# Patient Record
Sex: FEMALE | Race: WHITE | Employment: FULL TIME | URBAN - METROPOLITAN AREA
[De-identification: names, ages, dates, MRNs, and addresses within clinical notes are randomized per-mention and may not be internally consistent; named-entity substitution may affect disease eponyms.]

---

## 2018-03-05 ENCOUNTER — OFFICE VISIT (OUTPATIENT)
Dept: FAMILY MEDICINE CLINIC | Facility: CLINIC | Age: 23
End: 2018-03-05

## 2018-03-05 VITALS
HEART RATE: 60 BPM | RESPIRATION RATE: 18 BRPM | HEIGHT: 66 IN | BODY MASS INDEX: 22.02 KG/M2 | WEIGHT: 137 LBS | SYSTOLIC BLOOD PRESSURE: 99 MMHG | DIASTOLIC BLOOD PRESSURE: 65 MMHG

## 2018-03-05 DIAGNOSIS — F41.9 ANXIETY: ICD-10-CM

## 2018-03-05 PROCEDURE — 99212 OFFICE O/P EST SF 10 MIN: CPT | Performed by: FAMILY MEDICINE

## 2018-03-05 PROCEDURE — 99213 OFFICE O/P EST LOW 20 MIN: CPT | Performed by: FAMILY MEDICINE

## 2018-03-05 NOTE — PROGRESS NOTES
HPI:    Patient ID: Criss Hampton is a 23year old female. Pt presents with some left ear discomfort and associated. Pt also has had some left sided chest tightness. Pt has had some tingling of the face. Pt did have some mild cold symptoms.  Pt did have s were placed in this encounter.       Meds This Visit:  No prescriptions requested or ordered in this encounter    Imaging & Referrals:  None       #[id number]

## 2018-03-26 ENCOUNTER — OFFICE VISIT (OUTPATIENT)
Dept: OTOLARYNGOLOGY | Facility: CLINIC | Age: 23
End: 2018-03-26

## 2018-03-26 VITALS
SYSTOLIC BLOOD PRESSURE: 98 MMHG | DIASTOLIC BLOOD PRESSURE: 62 MMHG | WEIGHT: 135 LBS | HEIGHT: 66 IN | BODY MASS INDEX: 21.69 KG/M2 | TEMPERATURE: 98 F

## 2018-03-26 DIAGNOSIS — M26.69 TMJ INFLAMMATION: Primary | ICD-10-CM

## 2018-03-26 PROCEDURE — 99244 OFF/OP CNSLTJ NEW/EST MOD 40: CPT | Performed by: OTOLARYNGOLOGY

## 2018-03-26 PROCEDURE — 99212 OFFICE O/P EST SF 10 MIN: CPT | Performed by: OTOLARYNGOLOGY

## 2018-03-26 NOTE — PROGRESS NOTES
Eloisa Flanagan is a 23year old female.  Patient presents with:  Ear Problem: pt reports electric shock of pain in both ears (mostly in left)  and goes to they eye, happens everday for 1 month  Tonsil Problem: pt reports ball on left side of tonsil for 2 jens Behavioral issues   Integumentary Negative Frequent skin infections, pigment change and rash. Hema/Lymph Negative Easy bleeding and easy bruising.      PHYSICAL EXAM    BP 98/62 (BP Location: Left arm, Patient Position: Sitting, Cuff Size: adult)   Temp 9

## 2018-05-19 ENCOUNTER — OFFICE VISIT (OUTPATIENT)
Dept: FAMILY MEDICINE CLINIC | Facility: CLINIC | Age: 23
End: 2018-05-19

## 2018-05-19 VITALS
SYSTOLIC BLOOD PRESSURE: 101 MMHG | BODY MASS INDEX: 21.35 KG/M2 | WEIGHT: 136 LBS | DIASTOLIC BLOOD PRESSURE: 55 MMHG | TEMPERATURE: 99 F | HEART RATE: 55 BPM | HEIGHT: 67 IN

## 2018-05-19 DIAGNOSIS — Z11.1 SCREENING FOR TUBERCULOSIS: ICD-10-CM

## 2018-05-19 DIAGNOSIS — Z00.00 ROUTINE GENERAL MEDICAL EXAMINATION AT A HEALTH CARE FACILITY: Primary | ICD-10-CM

## 2018-05-19 PROCEDURE — 99395 PREV VISIT EST AGE 18-39: CPT | Performed by: PHYSICIAN ASSISTANT

## 2018-05-19 PROCEDURE — 86580 TB INTRADERMAL TEST: CPT | Performed by: PHYSICIAN ASSISTANT

## 2018-05-19 NOTE — PROGRESS NOTES
HPI:   Eda Gentile is a 23year old female who presents for physical exam for grad school program.  She does not have her immunization record with her and does not know date of last Tdap. She needs tuberculosis screening.   No history of positive PPD in p seizures, dizziness, syncope, paralysis, ataxia, numbness or tingling in the extremities,change in bowel or bladder control. HEMATOLOGIC:  Denies anemia, bleeding or bruising. LYMPHATICS:  Denies enlarged nodes or history of splenectomy.   ENDOCRINOLOGIC: INTRADERMAL TEST        Return in about 2 days (around 5/21/2018) for Tb reading.     Patricia Recinos PA-C  5/19/2018

## 2018-06-11 ENCOUNTER — TELEPHONE (OUTPATIENT)
Dept: FAMILY MEDICINE CLINIC | Facility: CLINIC | Age: 23
End: 2018-06-11

## 2018-06-11 NOTE — TELEPHONE ENCOUNTER
Pt is requesting TB test. Pt stts she went in and had TB but missed her TB reading due to schedule conflict,pt stts she was informed by office of a late time she can come in but when she came to the office no one was there. . Pt has been in contact with sienna

## 2018-06-16 ENCOUNTER — NURSE ONLY (OUTPATIENT)
Dept: FAMILY MEDICINE CLINIC | Facility: CLINIC | Age: 23
End: 2018-06-16

## 2018-06-16 PROCEDURE — 86580 TB INTRADERMAL TEST: CPT | Performed by: PHYSICIAN ASSISTANT

## 2018-06-18 ENCOUNTER — NURSE ONLY (OUTPATIENT)
Dept: FAMILY MEDICINE CLINIC | Facility: CLINIC | Age: 23
End: 2018-06-18

## 2018-06-18 DIAGNOSIS — Z11.1 ENCOUNTER FOR PPD SKIN TEST READING: Primary | ICD-10-CM

## 2018-07-14 NOTE — TELEPHONE ENCOUNTER
Pt booked the appt for 6-16-18 @ 8:30. Pt to imaging department from waiting room and imaging staff advised that pt can be brought back to room 7 when done.

## 2018-11-18 ENCOUNTER — HOSPITAL ENCOUNTER (EMERGENCY)
Facility: HOSPITAL | Age: 23
Discharge: HOME | End: 2018-11-18
Attending: EMERGENCY MEDICINE
Payer: COMMERCIAL

## 2018-11-18 ENCOUNTER — APPOINTMENT (OUTPATIENT)
Dept: CT IMAGING | Facility: HOSPITAL | Age: 23
End: 2018-11-18
Attending: EMERGENCY MEDICINE
Payer: COMMERCIAL

## 2018-11-18 VITALS
BODY MASS INDEX: 21.69 KG/M2 | DIASTOLIC BLOOD PRESSURE: 50 MMHG | WEIGHT: 135 LBS | SYSTOLIC BLOOD PRESSURE: 95 MMHG | HEART RATE: 55 BPM | HEIGHT: 66 IN | OXYGEN SATURATION: 99 % | TEMPERATURE: 98 F | RESPIRATION RATE: 18 BRPM

## 2018-11-18 DIAGNOSIS — R10.9 LEFT FLANK PAIN: Primary | ICD-10-CM

## 2018-11-18 PROCEDURE — 80048 BASIC METABOLIC PNL TOTAL CA: CPT | Performed by: EMERGENCY MEDICINE

## 2018-11-18 PROCEDURE — 99284 EMERGENCY DEPT VISIT MOD MDM: CPT

## 2018-11-18 PROCEDURE — 36415 COLL VENOUS BLD VENIPUNCTURE: CPT

## 2018-11-18 PROCEDURE — 81003 URINALYSIS AUTO W/O SCOPE: CPT | Performed by: EMERGENCY MEDICINE

## 2018-11-18 PROCEDURE — 74176 CT ABD & PELVIS W/O CONTRAST: CPT | Performed by: EMERGENCY MEDICINE

## 2018-11-18 PROCEDURE — 81025 URINE PREGNANCY TEST: CPT

## 2018-11-18 PROCEDURE — 85025 COMPLETE CBC W/AUTO DIFF WBC: CPT | Performed by: EMERGENCY MEDICINE

## 2018-11-18 PROCEDURE — 85025 COMPLETE CBC W/AUTO DIFF WBC: CPT

## 2018-11-18 PROCEDURE — 80048 BASIC METABOLIC PNL TOTAL CA: CPT

## 2018-11-18 RX ORDER — PANTOPRAZOLE SODIUM 40 MG/1
40 TABLET, DELAYED RELEASE ORAL DAILY
Qty: 30 TABLET | Refills: 0 | Status: SHIPPED | OUTPATIENT
Start: 2018-11-18 | End: 2018-12-18

## 2018-11-18 NOTE — ED PROVIDER NOTES
Patient Seen in: [facility] AND [facility] Emergency Department    History   Patient presents with:  Abdomen/Flank Pain (GI/)    Stated Complaint: abdominal flank pain    HPI    Pt is 19 yo F who p/w 5-6 days of constant left flank pain.  Progressively getting motor strength in all extremities, no focal deficits  SKIN: warm, dry, no rashes        ED Course     Labs Reviewed   BASIC METABOLIC PANEL (8) - Normal   EMH POCT PREGNANCY URINE - Normal   CBC WITH DIFFERENTIAL WITH PLATELET    Narrative:      The followi try a trial of Protonix and bland diet and advised follow-up with her PCP this week.             Disposition and Plan     Clinical Impression:  Left flank pain  (primary encounter diagnosis)    Disposition:  Discharge  11/18/2018  7:30 am    Follow-up:  Judy

## 2018-11-19 ENCOUNTER — TELEPHONE (OUTPATIENT)
Dept: FAMILY MEDICINE CLINIC | Facility: CLINIC | Age: 23
End: 2018-11-19

## 2018-11-19 NOTE — TELEPHONE ENCOUNTER
Dr Annika Chavez, please advise. Patient seen in ER yesterday for abdominal pain, after UC visit on Friday. Was told it may be an ulcer, advised to take pantoprazole 40 mg, which she is doing daily.  Scheduled for ER f/u Wed 11/21/18 at 9:30 am as she has multiple

## 2018-11-19 NOTE — TELEPHONE ENCOUNTER
Spoke with pt informed of  message below. Pt to call us back if symptoms do not resolve. Pt verbalized understanding. Pt had no questions or concerns at present moment.

## 2018-11-21 ENCOUNTER — OFFICE VISIT (OUTPATIENT)
Dept: FAMILY MEDICINE CLINIC | Facility: CLINIC | Age: 23
End: 2018-11-21
Payer: COMMERCIAL

## 2018-11-21 VITALS
WEIGHT: 138 LBS | SYSTOLIC BLOOD PRESSURE: 105 MMHG | HEIGHT: 67 IN | TEMPERATURE: 98 F | BODY MASS INDEX: 21.66 KG/M2 | RESPIRATION RATE: 18 BRPM | DIASTOLIC BLOOD PRESSURE: 63 MMHG | HEART RATE: 66 BPM

## 2018-11-21 DIAGNOSIS — K29.00 ACUTE GASTRITIS WITHOUT HEMORRHAGE, UNSPECIFIED GASTRITIS TYPE: Primary | ICD-10-CM

## 2018-11-21 DIAGNOSIS — R10.13 EPIGASTRIC PAIN: ICD-10-CM

## 2018-11-21 PROCEDURE — 99213 OFFICE O/P EST LOW 20 MIN: CPT | Performed by: FAMILY MEDICINE

## 2018-11-21 NOTE — PROGRESS NOTES
HPI:    Patient ID: Daniela Cartwright is a 20year old female. Pt presents for follow up from the urgent care and ER for abdominal pains of the left side over the past week. Was diagnosed with possible GERD/ gastritis.  Patient is being treated with pantopraz reviewed and negative except as noted above.       Physical Exam       ED Triage Vitals  BP 11/18/18 0452 114/80  Pulse 11/18/18 0452 69  Resp 11/18/18 0452 18  Temp 11/18/18 0453 98 °F (36.7 °C)  Temp src --    SpO2 11/18/18 0452 100 %  O2 Device 11/18/18 nephroureterolithiasis or obstructive uropathy. 2. No acute intra-abdominal process is identified by noncontrast CT technique. The etiology of the patient's symptoms is unclear from this study.   3. Right ovarian follicular cyst.  4. Trace free pelvic flui 1 tablet (40 mg total) by mouth daily. Disp: 30 tablet Rfl: 0     Allergies:No Known Allergies   PHYSICAL EXAM:   Physical Exam   Constitutional: She appears well-developed and well-nourished.    HENT:   Mouth/Throat: Oropharynx is clear and moist.   Cardio

## 2018-11-26 ENCOUNTER — OFFICE VISIT (OUTPATIENT)
Dept: GASTROENTEROLOGY | Facility: CLINIC | Age: 23
End: 2018-11-26
Payer: COMMERCIAL

## 2018-11-26 VITALS
WEIGHT: 136 LBS | SYSTOLIC BLOOD PRESSURE: 106 MMHG | HEART RATE: 73 BPM | BODY MASS INDEX: 21.35 KG/M2 | HEIGHT: 67 IN | DIASTOLIC BLOOD PRESSURE: 63 MMHG

## 2018-11-26 DIAGNOSIS — R10.12 LUQ PAIN: Primary | ICD-10-CM

## 2018-11-26 PROCEDURE — 99212 OFFICE O/P EST SF 10 MIN: CPT | Performed by: NURSE PRACTITIONER

## 2018-11-26 PROCEDURE — 99243 OFF/OP CNSLTJ NEW/EST LOW 30: CPT | Performed by: NURSE PRACTITIONER

## 2018-11-26 NOTE — H&P
[address] [facility] [address] Gastroenterology                                                                                                  Clinic History and Physical     Pa stable.    + Occasional bright red blood on the toilet tissue \"my whole life\" related to hemorrhoids. No recent change in bowel habits, hematochezia, or melena.         Pertinent Surgical Hx:  No abdominal surgery  - See additional surgical hx below  - N sub-sternal chest pain  GASTROINTESTINAL:  see HPI  GENITOURINARY:  negative for dysuria or gross hematuria  INTEGUMENT/BREAST:  SKIN:  negative for jaundice   ALLERGIC/IMMUNOLOGIC:  negative for hay fever  ENDOCRINE:  negative for cold intolerance and hea celiac disease given the lack of lower GI symptoms though the patient would be amenable to completing labs to rule this out.   Given her symptom improvement since her ER visit we discussed conservative management including continuing Protonix, cautious obse

## 2018-11-26 NOTE — PATIENT INSTRUCTIONS
1. Continue Protonix daily - let us know when you need refill  2. Continue easy to digest foods/bland diet and gradually add back your normal foods as tolerated  3. Complete celiac testing  4.   Follow-up in 3 months        Avoid Heartburn Triggers  - Patti Alarcon

## 2018-11-27 ENCOUNTER — PATIENT MESSAGE (OUTPATIENT)
Dept: GASTROENTEROLOGY | Facility: CLINIC | Age: 23
End: 2018-11-27

## 2018-11-27 NOTE — TELEPHONE ENCOUNTER
From: Chriss Flores  To: REFUGIO Mcallister  Sent: 11/27/2018 11:12 AM CST  Subject: Test Results Question    Haley Ragsdale,    Thank you for seeing me yesterday.  I am just now reviewing the notes from my visit and I remember you mentioned getting tested for

## 2018-11-28 ENCOUNTER — APPOINTMENT (OUTPATIENT)
Dept: LAB | Age: 23
End: 2018-11-28
Attending: NURSE PRACTITIONER
Payer: COMMERCIAL

## 2018-11-28 DIAGNOSIS — R10.12 LUQ PAIN: ICD-10-CM

## 2018-11-28 PROCEDURE — 82784 ASSAY IGA/IGD/IGG/IGM EACH: CPT | Performed by: NURSE PRACTITIONER

## 2018-11-28 PROCEDURE — 83516 IMMUNOASSAY NONANTIBODY: CPT

## 2018-11-28 PROCEDURE — 36415 COLL VENOUS BLD VENIPUNCTURE: CPT | Performed by: NURSE PRACTITIONER

## 2019-03-03 ENCOUNTER — HOSPITAL ENCOUNTER (EMERGENCY)
Facility: HOSPITAL | Age: 24
Discharge: STILL ADMITTED OR ADMINISTRATIVE | End: 2019-03-03
Payer: COMMERCIAL

## 2019-03-21 NOTE — TELEPHONE ENCOUNTER
Occupational Therapy Evaluation    Visit Count: 1   Plan of Care: 3/21/2019 Through: 6/13/2019  Insurance Information: [facility]  ID: [id number]     Group #: [id number]  Eff Date: 1/1/19 (calendar year policy)      HARD Visit Limit: 20 visits per calendar year            - Combined: No            - Used: 0  Auth Required: No     Co-pay: $30  Ded: $3500 - $286.33 Met  Pays at: 100% After Ded MET then CoPay until OOP met  OOP: $6350 - $316.33 Met  Secondary Insurance: none  Referred by: Leif Macias MD; Next provider visit (if known/scheduled): 4/2/19  Medical Diagnosis (from order): M79.642 Left hand pain right hand flexor tenosynovitis  Treatment Diagnosis: finger symptoms with increased pain/symptoms, impaired strength, impaired range of motion    Date of onset/injury: pain started Sunday or Monday  Diagnosis Precautions: none  Chart reviewed at time of initial evaluation (relevant co-morbidities, allergies, tests and medications listed): history of cellulitis last year; see EMR for details     SUBJECTIVE   Description of Problem/Mechanism of Injury: Pt reports that out of the blue her finger started to swell and get red.  She went to her primary MD and was given antibiotics and told to follow up if it worsened.  Pt reports that she was referred to Dr Macias and now she has increased edema in RF and SF and unable to move them.  She has been diagnosed with cellulitis and is now is planning to have IV antibiotics.   Pain:  Intensity: Now: 10/10  Location: at the MP of the SF  Quality/Description: Ache, Sharp  Relieving/Alleviating factors: rest    Function:  Limitations and exacerbating factors: pain, difficulty, increased time to complete with all activities/tasks with involved extremity  Prior level: independent with all activities of daily living and instrumental activities of daily living  Personal Occupations Profile Affected: bathing/showering, upper body dressing, personal hygiene/grooming, home  Yes okay to repeat test.  Order entered. establishment/managements, meal preparation/cleanup  Patient Goal: \"to get better and not have pain; go on my vacation\"    Prior Treatment: no therapies in the past year for current condition. Hospitalization, home health services or skilled nursing facility in the last 30 days: No, per patient.  Home Environment/Social Support: Patient lives with significant other and children; assistance as needed from family/friends available.    Safety:  Do you feel safe at home, work and/or school? yes, per patient  Patient denies 2 or more falls or an unexplained fall with injury in the last year, further testing not required   Patient Goals/Concerns:  \"to get better and not have pain; go on my vacation\"    OBJECTIVE   Hand Dominance: right  Extremity Affected/Involved: left    Observation:   -increased edema and redness along the ulnar side of the SF    Edema:   Digit Circumferential: (cm)  Finger Ringer  Small    Date 3/21/19 3/21/19   Phalanx       Proximal 5.5 6.5     Middle 5.0 5.0     Distal 4.2 4.2   Key: MCP=metacarpophalangeal joint, PIP=proximal interphalangeal joint, DIP=distal interphalangeal joints    Wrist/Hand Circumferential: (cm)   Left   Date 3/21/19   Proximal palmar crease 18.5   Metacarpophalangeal Row 18.7   Wrist at Styloids 14.2      Neurological Findings:   Pt reports feeling deep pressure; but denies full numbness as she reports that she feels this is due to her not moving her fingers.    Type of Orthosis/Cast Issued:   Custom ulnar gutter splint    Initial Treatment   Initial evaluation completed.    Therapeutic Activity:  Patient educated on use and care for splint including don/doff of splint, precautions, care of splint, and wear schedule.  patient Verbalizes understanding and Demonstrates understanding of information and education provided.  *discussion with pt regarding her treatment of IV antibiotics and getting MA from Dr Macias as pt had questions regarding this that therapist was unable to  answer    Splint:  Type of splint ordered: ulnar gutter  Purpose of splint: protect finger while healing    Skilled input: verbal instruction/cues, education/instruction on use of splint, fabrication of orthosis    Initial Home Program:  * above=instructed home program    Use of splint, care, wearing schedule and precautions    Writer verbally educated the patient and received verbal consent from the patient on hand placement, positioning of patient, and techniques to be performed today including hand placement for fabrication of orthosis as described above and how they are pertinent to the patient's plan of care.     Suggestions for next session as indicated: progress per plan of care, modifications to orthosis as needed; progress therapy per MD    ASSESSMENT   39 year old female patient has signs and symptoms consistent with left SF pain/cellulitis that has reported functional limitations listed above. Pt has increased pain, edema and limitations with ROM at this time limiting her ADLs and IADLs.    Patient will benefit from skilled therapy and rehab potential is good based on assessment above   Clinical decision making: Low - Patient has few limitations (1-3), comorbidities and/or complexities, as noted in problem focused assessment noted above, that impact their occupational profile.  Resulting in few treatment options and no task modification consistent with low clinical decision making complexity.    PLAN   Goals: To be obtained by end of this plan of care:  1. Patient independent with modified and progressed home exercise program.  2. Fabricate/fit patient with ordered splint. ( MET )  3. Patient and/or caregiver will verbalize understanding of precautions and wearing of splint.  ( MET )  4. Demonstrates ability to don/doff splint independently.  ( MET )    The following skilled interventions to be implemented to achieve above:  Manual Therapy (54343)  Neuromuscular Re-Education (25686)  Therapeutic Activity  (14625)  Therapeutic Exercise (32844)  Fluidotherapy/Whirlpool (33391/75065)  Heat/Cold (80267)  Ultrasound/Phonophoresis (67011)  Orthotics Training (12457)  Paraffin (61231)    Frequency/Duration: pt seen for splinting and will be seen for modifications as needed; plan to progress plan of care per MD and POC will be determined at that time    The plan of care and goals were established with the patient who concurs.  Patient has been given attendance policy at time of initial evaluation.    Patient Education:  Who will be receiving education: patient  Are they ready to learn: yes  Preferred learning style: written, verbal, demonstration  Barriers to learning: no barriers apparent at this time   Result of initial outlined education: Verbalizes understanding and Demonstrates understanding    THERAPY DAILY BILLING   Insurance: 1. [place] 2. N/A    Evaluation Procedures:  Occupational Therapy Evaluation: Low Complexity    Timed Procedures:  Therapeutic Activity, 15 minutes    Untimed Procedures:   Ulnar wrist gutter/cock up; Wrist Hand Orthosis (WHO) without joints custom fabricated    Total Treatment Time: 60 minutes

## 2020-04-12 ENCOUNTER — PATIENT MESSAGE (OUTPATIENT)
Dept: GASTROENTEROLOGY | Facility: CLINIC | Age: 25
End: 2020-04-12

## 2020-04-13 NOTE — TELEPHONE ENCOUNTER
Nursing: I have not seen this pt since 2018. I would typically recommend OV however in light of COVID-19, I would not recommend an in person visit unless the pt has severe symptoms.  If she is in agreement, then we could consider a televisit - if proceeding

## 2020-04-13 NOTE — TELEPHONE ENCOUNTER
From: Jasmin Larsen  To: REFUGIO Turner  Sent: 4/12/2020 9:39 AM CDT  Subject: Non-Urgent Medical Question    Sarha Cerrato,  I have another question regarding gastorinestinal symptoms I'm having.  The left side pain has come back off and on for the last f

## 2020-04-13 NOTE — TELEPHONE ENCOUNTER
Kristi Boyd called the patient and she reported that her stools are not tar-like, they are more of a dark brown. The stools are usually soft and pencil thin. Denies hard stools. Feels like she is not able to empty bowels all of the way.       Denies karrie

## 2020-04-13 NOTE — TELEPHONE ENCOUNTER
Left message on cell voicemail to call back re earlier appt. Attempted work number, but office closed. I will add per Betzaida Ramos below when patient calls back. Also sent [employer] message to call us.

## 2020-04-13 NOTE — TELEPHONE ENCOUNTER
Pt returned call. . I made a virtual appt for 5/20 I know  wants to have call this week or next week so I told pt you would get back to her and maybe could overbook. . Please advise

## 2020-04-14 NOTE — TELEPHONE ENCOUNTER
Bigg Jurado    Patient accepted a televisit for tomorrow 4/15/2020 at 11:30am.    Informed that this will be billed to her insurance and she is to call her insurance to check for coverage of televisits. Thank you.

## 2020-04-20 NOTE — TELEPHONE ENCOUNTER
I spoke with the patient this morning. Unfortunately telemedicine both by telephone and virtual are not covered by her insurance plan.   We briefly reviewed her symptoms which includes occasional bright red blood on the toilet tissue and difficulty fully e

## 2020-05-14 DIAGNOSIS — Z12.31 ENCOUNTER FOR SCREENING MAMMOGRAM FOR MALIGNANT NEOPLASM OF BREAST: Primary | ICD-10-CM

## 2020-05-18 ENCOUNTER — APPOINTMENT (OUTPATIENT)
Dept: MAMMOGRAPHY | Age: 25
End: 2020-05-18
Attending: OBSTETRICS & GYNECOLOGY

## 2020-05-29 ENCOUNTER — HOSPITAL ENCOUNTER (OUTPATIENT)
Dept: MAMMOGRAPHY | Age: 25
End: 2020-05-29
Attending: OBSTETRICS & GYNECOLOGY

## 2020-06-01 ENCOUNTER — HOSPITAL ENCOUNTER (OUTPATIENT)
Dept: ULTRASOUND IMAGING | Age: 25
Discharge: HOME | End: 2020-06-01
Attending: OBSTETRICS & GYNECOLOGY

## 2020-06-01 ENCOUNTER — HOSPITAL ENCOUNTER (OUTPATIENT)
Dept: MAMMOGRAPHY | Age: 25
Discharge: HOME | End: 2020-06-01
Attending: OBSTETRICS & GYNECOLOGY

## 2020-06-01 DIAGNOSIS — N63.0 LUMP OR MASS IN BREAST: ICD-10-CM

## 2020-06-01 DIAGNOSIS — N63.10 BREAST MASS, RIGHT: ICD-10-CM

## 2020-06-01 PROCEDURE — 76642 ULTRASOUND BREAST LIMITED: CPT

## 2020-06-03 ENCOUNTER — HOSPITAL ENCOUNTER (OUTPATIENT)
Dept: ULTRASOUND IMAGING | Age: 25
Discharge: HOME | End: 2020-06-03
Attending: OBSTETRICS & GYNECOLOGY

## 2020-06-03 ENCOUNTER — HOSPITAL ENCOUNTER (OUTPATIENT)
Dept: MAMMOGRAPHY | Age: 25
Discharge: HOME | End: 2020-06-03
Attending: OBSTETRICS & GYNECOLOGY

## 2020-06-03 DIAGNOSIS — N63.10 BREAST MASS, RIGHT: ICD-10-CM

## 2020-06-03 DIAGNOSIS — Z98.890 STATUS POST RIGHT BREAST BIOPSY: ICD-10-CM

## 2020-06-03 PROCEDURE — A4648 IMPLANTABLE TISSUE MARKER: HCPCS

## 2020-06-03 PROCEDURE — 77065 DX MAMMO INCL CAD UNI: CPT

## 2020-06-03 PROCEDURE — 19083 BX BREAST 1ST LESION US IMAG: CPT

## 2020-06-03 PROCEDURE — 10006027 HB SUPPLY 278

## 2020-06-03 PROCEDURE — 88305 TISSUE EXAM BY PATHOLOGIST: CPT

## 2020-06-03 PROCEDURE — 10006023 HB SUPPLY 272

## 2020-06-03 RX ORDER — LIDOCAINE HYDROCHLORIDE 20 MG/ML
3 INJECTION, SOLUTION INFILTRATION; PERINEURAL ONCE
Status: DISCONTINUED | OUTPATIENT
Start: 2020-06-03 | End: 2020-06-05 | Stop reason: HOSPADM

## 2020-06-04 LAB — PATHOLOGIST NAME: NORMAL

## 2020-06-18 ENCOUNTER — OFFICE VISIT (OUTPATIENT)
Dept: SURGERY | Age: 25
End: 2020-06-18

## 2020-06-18 ENCOUNTER — HOSPITAL (OUTPATIENT)
Dept: OTHER | Age: 25
End: 2020-06-18
Attending: SURGERY

## 2020-06-18 VITALS
RESPIRATION RATE: 16 BRPM | BODY MASS INDEX: 21.69 KG/M2 | SYSTOLIC BLOOD PRESSURE: 102 MMHG | DIASTOLIC BLOOD PRESSURE: 63 MMHG | HEIGHT: 66 IN | WEIGHT: 135 LBS | HEART RATE: 73 BPM

## 2020-06-18 DIAGNOSIS — N63.10 BREAST MASS, RIGHT: Primary | ICD-10-CM

## 2020-06-18 PROBLEM — Z34.90 CURRENTLY PREGNANT: Status: ACTIVE | Noted: 2020-06-18

## 2020-06-18 PROCEDURE — 99203 OFFICE O/P NEW LOW 30 MIN: CPT | Performed by: SURGERY

## 2020-07-01 ENCOUNTER — HOSPITAL (OUTPATIENT)
Dept: OTHER | Age: 25
End: 2020-07-01
Attending: SURGERY

## 2020-07-31 ENCOUNTER — OFFICE VISIT (OUTPATIENT)
Dept: OBGYN CLINIC | Facility: CLINIC | Age: 25
End: 2020-07-31
Payer: COMMERCIAL

## 2020-07-31 DIAGNOSIS — Z71.89 PRENATAL CONSULT: Primary | ICD-10-CM

## 2020-07-31 NOTE — PROGRESS NOTES
@ 11 wks here for meet & greet. No records but reports uncomplicated pregnancy. No medical problems. Exploring options. Interested in natural birth. CNM practice/philosphies discussed. If desires LAYNE to send records and schedule nurse Ed visit.  Blaise Harris for

## 2020-08-01 ENCOUNTER — HOSPITAL (OUTPATIENT)
Dept: OTHER | Age: 25
End: 2020-08-01
Attending: SURGERY

## 2020-08-13 ENCOUNTER — TELEPHONE (OUTPATIENT)
Dept: SURGERY | Age: 25
End: 2020-08-13

## 2020-08-19 ENCOUNTER — APPOINTMENT (OUTPATIENT)
Dept: SURGERY | Age: 25
End: 2020-08-19

## 2020-09-01 ENCOUNTER — HOSPITAL (OUTPATIENT)
Dept: OTHER | Age: 25
End: 2020-09-01
Attending: SURGERY

## 2021-02-09 ENCOUNTER — MED REC SCAN ONLY (OUTPATIENT)
Dept: FAMILY MEDICINE CLINIC | Facility: CLINIC | Age: 26
End: 2021-02-09

## 2021-02-10 ENCOUNTER — MED REC SCAN ONLY (OUTPATIENT)
Dept: FAMILY MEDICINE CLINIC | Facility: CLINIC | Age: 26
End: 2021-02-10

## 2022-01-01 NOTE — ED INITIAL ASSESSMENT (HPI)
Pt reports she was seen at the immediate for same complaint of LLQ abd pain. A pelvic exam was done. Pt states she also did have some vaginal discharge. The DR told pt she probably has an ovarian cyst. Pt was advised to go to ER if pain got worse.  Pt was p
-Bleeding from circumcision site (boy babies only)

## (undated) NOTE — LETTER
Daniel Montalvo Md  [address] [place] [id number]       03/26/18        Patient: Criss Hampton   YOB: 1995   Date of Visit: 3/26/2018       Dear  Dr. Forrest Downing MD,      Thank you for referring Criss Hampton to my practice.   Please f

## (undated) NOTE — ED AVS SNAPSHOT
Christoph Weaver   MRN: [id number]    Department:  [facility] Emergency Department   Date of Visit:  11/18/2018           Disclosure     Insurance plans vary and the physician(s) referred by the ER may not be covered by your plan.  Please contact yo CARE PHYSICIAN AT ONCE OR RETURN IMMEDIATELY TO THE EMERGENCY DEPARTMENT. If you have been prescribed any medication(s), please fill your prescription right away and begin taking the medication(s) as directed.   If you believe that any of the medications